# Patient Record
Sex: MALE | Race: WHITE | Employment: FULL TIME | URBAN - METROPOLITAN AREA
[De-identification: names, ages, dates, MRNs, and addresses within clinical notes are randomized per-mention and may not be internally consistent; named-entity substitution may affect disease eponyms.]

---

## 2020-07-20 ENCOUNTER — APPOINTMENT (OUTPATIENT)
Dept: RADIOLOGY | Facility: CLINIC | Age: 30
End: 2020-07-20
Payer: OTHER MISCELLANEOUS

## 2020-07-20 ENCOUNTER — OCCMED (OUTPATIENT)
Dept: URGENT CARE | Facility: CLINIC | Age: 30
End: 2020-07-20
Payer: OTHER MISCELLANEOUS

## 2020-07-20 DIAGNOSIS — S39.92XA BACK INJURY, INITIAL ENCOUNTER: ICD-10-CM

## 2020-07-20 DIAGNOSIS — S39.92XA BACK INJURY, INITIAL ENCOUNTER: Primary | ICD-10-CM

## 2020-07-20 PROCEDURE — 99283 EMERGENCY DEPT VISIT LOW MDM: CPT | Performed by: PHYSICIAN ASSISTANT

## 2020-07-20 PROCEDURE — 72072 X-RAY EXAM THORAC SPINE 3VWS: CPT

## 2020-07-20 PROCEDURE — G0382 LEV 3 HOSP TYPE B ED VISIT: HCPCS | Performed by: PHYSICIAN ASSISTANT

## 2020-07-23 ENCOUNTER — APPOINTMENT (OUTPATIENT)
Dept: URGENT CARE | Facility: CLINIC | Age: 30
End: 2020-07-23
Payer: OTHER MISCELLANEOUS

## 2020-07-23 PROCEDURE — 99213 OFFICE O/P EST LOW 20 MIN: CPT

## 2023-09-22 ENCOUNTER — OFFICE VISIT (OUTPATIENT)
Dept: URGENT CARE | Facility: CLINIC | Age: 33
End: 2023-09-22
Payer: COMMERCIAL

## 2023-09-22 VITALS
BODY MASS INDEX: 30.81 KG/M2 | DIASTOLIC BLOOD PRESSURE: 69 MMHG | OXYGEN SATURATION: 99 % | HEART RATE: 102 BPM | SYSTOLIC BLOOD PRESSURE: 152 MMHG | WEIGHT: 208 LBS | HEIGHT: 69 IN | TEMPERATURE: 102.8 F | RESPIRATION RATE: 20 BRPM

## 2023-09-22 DIAGNOSIS — J02.9 SORE THROAT: ICD-10-CM

## 2023-09-22 DIAGNOSIS — B34.9 VIRAL INFECTION: Primary | ICD-10-CM

## 2023-09-22 LAB — S PYO AG THROAT QL: NEGATIVE

## 2023-09-22 PROCEDURE — 87070 CULTURE OTHR SPECIMN AEROBIC: CPT | Performed by: FAMILY MEDICINE

## 2023-09-22 PROCEDURE — 87636 SARSCOV2 & INF A&B AMP PRB: CPT | Performed by: FAMILY MEDICINE

## 2023-09-22 PROCEDURE — 87880 STREP A ASSAY W/OPTIC: CPT | Performed by: FAMILY MEDICINE

## 2023-09-22 RX ORDER — IBUPROFEN 600 MG/1
600 TABLET ORAL EVERY 6 HOURS PRN
Qty: 60 TABLET | Refills: 0 | Status: SHIPPED | OUTPATIENT
Start: 2023-09-22

## 2023-09-22 NOTE — PROGRESS NOTES
[facility]        NAME: Margarita Garcia is a 29 y.o. male  : 1990    MRN: [id number]  DATE: 2023  TIME: 7:37 PM    Assessment and Plan   Viral infection [B34.9]  1. Viral infection  Covid/Flu-Office Collect    Throat culture    ibuprofen (MOTRIN) 600 mg tablet      2. Sore throat  POCT rapid strepA            Patient Instructions       Follow up with PCP in 3-5 days. Proceed to  ER if symptoms worsen. Chief Complaint     Chief Complaint   Patient presents with   • Fever     Patient presents with fever/chills, body aches, fatigue and sore throat that began yesterday. Denies cough. History of Present Illness       28-year-old male with 2-day history of sore throat fevers and chills. He also reports experiencing a mild headache today intermittently throughout the day in conjunction with back pain which he states he has been having for the past week after injuring his back. Denies any coughs or congestions. Denies any nausea or vomiting. Review of Systems   Review of Systems   Constitutional: Positive for fever. HENT: Negative. Eyes: Negative. Respiratory: Negative. Cardiovascular: Negative. Gastrointestinal: Negative. Genitourinary: Negative. Musculoskeletal: Positive for arthralgias and myalgias. Skin: Negative. Allergic/Immunologic: Negative. Neurological: Positive for headaches. Hematological: Negative. Psychiatric/Behavioral: Negative.           Current Medications       Current Outpatient Medications:   •  ibuprofen (MOTRIN) 600 mg tablet, Take 1 tablet (600 mg total) by mouth every 6 (six) hours as needed for mild pain, fever or headaches, Disp: 60 tablet, Rfl: 0    Current Allergies     Allergies as of 2023   • (No Known Allergies)            The following portions of the patient's history were reviewed and updated as appropriate: allergies, current medications, past family history, past medical history, past social history, past surgical history and problem list.     History reviewed. No pertinent past medical history. Past Surgical History:   Procedure Laterality Date   • HERNIA REPAIR         History reviewed. No pertinent family history. Medications have been verified. Objective   /69   Pulse 102   Temp (!) 102.8 °F (39.3 °C) (Temporal)   Resp 20   Ht 5' 9" (1.753 m)   Wt 94.3 kg (208 lb)   SpO2 99%   BMI 30.72 kg/m²   No LMP for male patient. Physical Exam     Physical Exam  Constitutional:       Appearance: He is well-developed. HENT:      Head: Normocephalic. Mouth/Throat:      Pharynx: No oropharyngeal exudate or posterior oropharyngeal erythema. Eyes:      Pupils: Pupils are equal, round, and reactive to light. Cardiovascular:      Rate and Rhythm: Regular rhythm. Tachycardia present. Pulmonary:      Effort: Pulmonary effort is normal.   Abdominal:      General: Abdomen is flat. Musculoskeletal:         General: Normal range of motion. Cervical back: Normal range of motion. Skin:     General: Skin is warm. Neurological:      Mental Status: He is alert and oriented to person, place, and time.

## 2023-09-23 LAB
FLUAV RNA RESP QL NAA+PROBE: NEGATIVE
FLUBV RNA RESP QL NAA+PROBE: NEGATIVE
SARS-COV-2 RNA RESP QL NAA+PROBE: NEGATIVE

## 2023-09-24 LAB — BACTERIA THROAT CULT: NORMAL

## 2023-09-25 LAB — BACTERIA THROAT CULT: NORMAL

## 2024-03-07 ENCOUNTER — APPOINTMENT (OUTPATIENT)
Dept: RADIOLOGY | Facility: CLINIC | Age: 34
End: 2024-03-07
Payer: OTHER MISCELLANEOUS

## 2024-03-07 ENCOUNTER — OCCMED (OUTPATIENT)
Dept: URGENT CARE | Facility: CLINIC | Age: 34
End: 2024-03-07
Payer: OTHER MISCELLANEOUS

## 2024-03-07 DIAGNOSIS — M25.512 ACUTE PAIN OF LEFT SHOULDER: ICD-10-CM

## 2024-03-07 DIAGNOSIS — M25.512 ACUTE PAIN OF LEFT SHOULDER: Primary | ICD-10-CM

## 2024-03-07 DIAGNOSIS — Z02.1 NEED FOR HISTORY AND PHYSICAL EXAMINATION FOR EMPLOYMENT: ICD-10-CM

## 2024-03-07 PROCEDURE — G0382 LEV 3 HOSP TYPE B ED VISIT: HCPCS

## 2024-03-07 PROCEDURE — 73030 X-RAY EXAM OF SHOULDER: CPT

## 2024-03-07 PROCEDURE — 99283 EMERGENCY DEPT VISIT LOW MDM: CPT

## 2024-03-11 ENCOUNTER — APPOINTMENT (OUTPATIENT)
Dept: URGENT CARE | Facility: CLINIC | Age: 34
End: 2024-03-11
Payer: OTHER MISCELLANEOUS

## 2024-03-11 PROCEDURE — 99213 OFFICE O/P EST LOW 20 MIN: CPT | Performed by: PHYSICIAN ASSISTANT

## 2024-07-18 ENCOUNTER — OCCMED (OUTPATIENT)
Dept: URGENT CARE | Facility: CLINIC | Age: 34
End: 2024-07-18
Payer: OTHER MISCELLANEOUS

## 2024-07-18 DIAGNOSIS — Z23 ENCOUNTER FOR IMMUNIZATION: Primary | ICD-10-CM

## 2024-07-18 PROCEDURE — 99283 EMERGENCY DEPT VISIT LOW MDM: CPT

## 2024-07-18 PROCEDURE — G0382 LEV 3 HOSP TYPE B ED VISIT: HCPCS

## 2024-07-22 ENCOUNTER — APPOINTMENT (OUTPATIENT)
Dept: URGENT CARE | Facility: CLINIC | Age: 34
End: 2024-07-22
Payer: OTHER MISCELLANEOUS

## 2024-07-22 PROCEDURE — 99214 OFFICE O/P EST MOD 30 MIN: CPT | Performed by: PHYSICIAN ASSISTANT

## 2025-03-13 ENCOUNTER — OFFICE VISIT (OUTPATIENT)
Dept: FAMILY MEDICINE CLINIC | Facility: HOSPITAL | Age: 35
End: 2025-03-13

## 2025-03-13 ENCOUNTER — TELEPHONE (OUTPATIENT)
Age: 35
End: 2025-03-13

## 2025-03-13 ENCOUNTER — APPOINTMENT (OUTPATIENT)
Dept: RADIOLOGY | Facility: CLINIC | Age: 35
End: 2025-03-13
Payer: COMMERCIAL

## 2025-03-13 VITALS
BODY MASS INDEX: 31.78 KG/M2 | TEMPERATURE: 97.3 F | OXYGEN SATURATION: 96 % | SYSTOLIC BLOOD PRESSURE: 116 MMHG | HEIGHT: 70 IN | HEART RATE: 69 BPM | WEIGHT: 222 LBS | DIASTOLIC BLOOD PRESSURE: 78 MMHG

## 2025-03-13 VITALS — BODY MASS INDEX: 32.88 KG/M2 | HEIGHT: 69 IN | WEIGHT: 222 LBS

## 2025-03-13 DIAGNOSIS — M25.512 ACUTE PAIN OF LEFT SHOULDER: ICD-10-CM

## 2025-03-13 DIAGNOSIS — Z13.29 SCREENING FOR THYROID DISORDER: ICD-10-CM

## 2025-03-13 DIAGNOSIS — L84 FOOT CALLUS: ICD-10-CM

## 2025-03-13 DIAGNOSIS — Z13.0 SCREENING, ANEMIA, DEFICIENCY, IRON: ICD-10-CM

## 2025-03-13 DIAGNOSIS — M25.512 ACUTE PAIN OF LEFT SHOULDER: Primary | ICD-10-CM

## 2025-03-13 DIAGNOSIS — E66.9 OBESITY (BMI 30-39.9): ICD-10-CM

## 2025-03-13 DIAGNOSIS — Z72.51 HIGH RISK SEXUAL BEHAVIOR, UNSPECIFIED TYPE: ICD-10-CM

## 2025-03-13 DIAGNOSIS — Z00.00 ANNUAL WELLNESS VISIT: Primary | ICD-10-CM

## 2025-03-13 DIAGNOSIS — Z76.89 ENCOUNTER TO ESTABLISH CARE: ICD-10-CM

## 2025-03-13 DIAGNOSIS — Z13.6 SCREENING FOR CARDIOVASCULAR CONDITION: ICD-10-CM

## 2025-03-13 PROCEDURE — 99203 OFFICE O/P NEW LOW 30 MIN: CPT

## 2025-03-13 PROCEDURE — 99385 PREV VISIT NEW AGE 18-39: CPT

## 2025-03-13 PROCEDURE — 73030 X-RAY EXAM OF SHOULDER: CPT

## 2025-03-13 PROCEDURE — 99204 OFFICE O/P NEW MOD 45 MIN: CPT | Performed by: ORTHOPAEDIC SURGERY

## 2025-03-13 NOTE — ASSESSMENT & PLAN NOTE
Concern for tendinitis vs sprain vs nerve impingement    Plan:  -Establish with ortho  -Will order MRI L shoulder given arm weakness and hand numbness, f/u results    Orders:    Ambulatory Referral to Orthopedic Surgery; Future    MRI shoulder left wo contrast; Future

## 2025-03-13 NOTE — LETTER
March 13, 2025     Patient: Mathew Sahu  YOB: 1990  Date of Visit: 3/13/2025      To Whom it May Concern:    Mathew Sahu is under my professional care. Mathew was seen in my office on 3/13/2025. Mathew has following work restrictions. No repetitive use of left shoulder/arm, no overhead use of left arm. Max lifting, pushing, pulling 25lbs until next office evaluation.     If you have any questions or concerns, please don't hesitate to call.         Sincerely,          Ceci Byrd MD        CC: No Recipients

## 2025-03-13 NOTE — PROGRESS NOTES
Assessment:     1. Acute pain of left shoulder        Plan:     Problem List Items Addressed This Visit          Surgery/Wound/Pain    Acute pain of left shoulder - Primary    Findings consistent with left shoulder acute on chronic pain.  Left shoulder x-ray reviewed with patient. Conservative treatment reviewed with patient. Concern for possible rotator cuff tear or bicep injury. At this time patient will be referred to physical therapy to rehab left shoulder. He will contact his insurance to see if MRI will be approved at [facility]. Instructed to bring copy of MRI if he does obtain. He was given work restrictions and currently not covered by . Max lifting, pushing, pulling 25 lbs, no repetitive use of left arm, no overhead lifting. Nsaids, topical creams as needed for pain. See back in 6 weeks for reevaluation. All patient's questions were answered to his satisfaction.  This note is created using dictation transcription.  It may contain typographical errors, grammatical errors, improperly dictated words, background noise and other errors.         Relevant Orders    XR shoulder 2+ vw left    Ambulatory Referral to Physical Therapy    Ambulatory Referral to Physical Therapy      Subjective:     Patient ID: Mathew Sahu is a 34 y.o. male.  Chief Complaint:  34 yr old male RHD in for evaluation of left shoulder pain.  Patient referred here by his family physician, Dr. Murguia. History of prior left shoulder injury treated by [facility]. No surgery needed. No formal treatment pain improved over some time. He would experience on and off pain in shoulder since injury last year.  New injury in January 2025. He states he was pulling a motor out of a machine at work. He was initially using a ratchet to loosen bolts and felt an acute pain in shoulder but continued to work. He then attached hoist lift for motor and was pulling it out but couldn't due to pain and needed assistance from another co worker. He was  able to range the arm but with pain. He states since onset pain has improved but still notes anterior based pain. Pain worse reaching behind to put coat or shirt on. Grabbing lifting items away from his body is painful. If he keeps shoulder and arm close to body he has no pain.  Pain at night laying flat on his back. No neck pain current numbness or tingling.  He did mention injury to his boss but this is not currently covered by .     Allergy:  No Known Allergies  Medications:  all current active meds have been reviewed  Past Medical History:  History reviewed. No pertinent past medical history.  Past Surgical History:  Past Surgical History:   Procedure Laterality Date    HERNIA REPAIR       Family History:  Family History   Problem Relation Age of Onset    Cancer Paternal Grandmother      Social History:  Social History     Substance and Sexual Activity   Alcohol Use Never     Social History     Substance and Sexual Activity   Drug Use Never     Social History     Tobacco Use   Smoking Status Former    Types: Cigarettes   Smokeless Tobacco Never     Review of Systems   Constitutional:  Negative for chills and fever.   HENT:  Negative for ear pain and sore throat.    Eyes:  Negative for pain and visual disturbance.   Respiratory:  Negative for cough and shortness of breath.    Cardiovascular:  Negative for chest pain and palpitations.   Gastrointestinal:  Negative for abdominal pain and vomiting.   Genitourinary:  Negative for dysuria and hematuria.   Musculoskeletal:  Positive for arthralgias (left shoulder). Negative for back pain and neck pain.   Skin:  Negative for color change and rash.   Neurological:  Negative for seizures, syncope, weakness and numbness.   Psychiatric/Behavioral: Negative.     All other systems reviewed and are negative.        Objective:  BP Readings from Last 1 Encounters:   03/13/25 116/78      Wt Readings from Last 1 Encounters:   03/13/25 101 kg (222 lb)      BMI:   Estimated body  "mass index is 32.78 kg/m² as calculated from the following:    Height as of this encounter: 5' 9\" (1.753 m).    Weight as of this encounter: 101 kg (222 lb).  BSA:   Estimated body surface area is 2.16 meters squared as calculated from the following:    Height as of this encounter: 5' 9\" (1.753 m).    Weight as of this encounter: 101 kg (222 lb).   Physical Exam  Vitals and nursing note reviewed.   Constitutional:       Appearance: Normal appearance. He is well-developed.   HENT:      Head: Normocephalic and atraumatic.      Right Ear: External ear normal.      Left Ear: External ear normal.   Eyes:      Extraocular Movements: Extraocular movements intact.      Conjunctiva/sclera: Conjunctivae normal.   Pulmonary:      Effort: Pulmonary effort is normal.   Musculoskeletal:         General: Tenderness (left shoulder arthralgia) present.      Cervical back: Neck supple.   Skin:     General: Skin is warm and dry.   Neurological:      Mental Status: He is alert and oriented to person, place, and time.      Deep Tendon Reflexes: Reflexes are normal and symmetric.   Psychiatric:         Mood and Affect: Mood normal.         Behavior: Behavior normal.       Left Shoulder Exam     Tenderness   The patient is experiencing tenderness in the biceps tendon.    Range of Motion   Active abduction:  normal   Passive abduction:  normal   Forward flexion:  normal   Internal rotation 0 degrees:  Lumbar     Muscle Strength   Abduction: 4/5   Internal rotation: 5/5   External rotation: 5/5   Biceps: 5/5     Tests   Cross arm: negative  Impingement: negative  Drop arm: negative    Other   Erythema: absent  Scars: absent  Sensation: normal  Pulse: present     Comments:  Pain against resisted ABD  Positive O'danielle's   Positive Speed  Positive empty can            I have personally reviewed pertinent films in PACS and my interpretation is x-rays left shoulder AC joint slight widen unchanged from previous imaging, well maintained joint " spaces, no calcifications.    Scribe Attestation      I,:  Deonte Hernandez am acting as a scribe while in the presence of the attending physician.:       I,:  Ceci Byrd MD personally performed the services described in this documentation    as scribed in my presence.:

## 2025-03-13 NOTE — ASSESSMENT & PLAN NOTE
Findings consistent with left shoulder acute on chronic pain.  Left shoulder x-ray reviewed with patient. Conservative treatment reviewed with patient. Concern for possible rotator cuff tear or bicep injury. At this time patient will be referred to physical therapy to rehab left shoulder. He will contact his insurance to see if MRI will be approved at [facility]. Instructed to bring copy of MRI if he does obtain. He was given work restrictions and currently not covered by . Max lifting, pushing, pulling 25 lbs, no repetitive use of left arm, no overhead lifting. Nsaids, topical creams as needed for pain. See back in 6 weeks for reevaluation. All patient's questions were answered to his satisfaction.  This note is created using dictation transcription.  It may contain typographical errors, grammatical errors, improperly dictated words, background noise and other errors.

## 2025-03-13 NOTE — PATIENT INSTRUCTIONS
"Patient Education     Routine physical for adults   The Basics   Written by the doctors and editors at [facility]   What is a physical? -- A physical is a routine visit, or \"check-up,\" with your doctor. You might also hear it called a \"wellness visit\" or \"preventive visit.\"  During each visit, the doctor will:   Ask about your physical and mental health   Ask about your habits, behaviors, and lifestyle   Do an exam   Give you vaccines if needed   Talk to you about any medicines you take   Give advice about your health   Answer your questions  Getting regular check-ups is an important part of taking care of your health. It can help your doctor find and treat any problems you have. But it's also important for preventing health problems.  A routine physical is different from a \"sick visit.\" A sick visit is when you see a doctor because of a health concern or problem. Since physicals are scheduled ahead of time, you can think about what you want to ask the doctor.  How often should I get a physical? -- It depends on your age and health. In general, for people age 21 years and older:   If you are younger than 50 years, you might be able to get a physical every 3 years.   If you are 50 years or older, your doctor might recommend a physical every year.  If you have an ongoing health condition, like diabetes or high blood pressure, your doctor will probably want to see you more often.  What happens during a physical? -- In general, each visit will include:   Physical exam - The doctor or nurse will check your height, weight, heart rate, and blood pressure. They will also look at your eyes and ears. They will ask about how you are feeling and whether you have any symptoms that bother you.   Medicines - It's a good idea to bring a list of all the medicines you take to each doctor visit. Your doctor will talk to you about your medicines and answer any questions. Tell them if you are having any side effects that bother you. You " "should also tell them if you are having trouble paying for any of your medicines.   Habits and behaviors - This includes:   Your diet   Your exercise habits   Whether you smoke, drink alcohol, or use drugs   Whether you are sexually active   Whether you feel safe at home  Your doctor will talk to you about things you can do to improve your health and lower your risk of health problems. They will also offer help and support. For example, if you want to quit smoking, they can give you advice and might prescribe medicines. If you want to improve your diet or get more physical activity, they can help you with this, too.   Lab tests, if needed - The tests you get will depend on your age and situation. For example, your doctor might want to check your:   Cholesterol   Blood sugar   Iron level   Vaccines - The recommended vaccines will depend on your age, health, and what vaccines you already had. Vaccines are very important because they can prevent certain serious or deadly infections.   Discussion of screening - \"Screening\" means checking for diseases or other health problems before they cause symptoms. Your doctor can recommend screening based on your age, risk, and preferences. This might include tests to check for:   Cancer, such as breast, prostate, cervical, ovarian, colorectal, prostate, lung, or skin cancer   Sexually transmitted infections, such as chlamydia and gonorrhea   Mental health conditions like depression and anxiety  Your doctor will talk to you about the different types of screening tests. They can help you decide which screenings to have. They can also explain what the results might mean.   Answering questions - The physical is a good time to ask the doctor or nurse questions about your health. If needed, they can refer you to other doctors or specialists, too.  Adults older than 65 years often need other care, too. As you get older, your doctor will talk to you about:   How to prevent falling at " home   Hearing or vision tests   Memory testing   How to take your medicines safely   Making sure that you have the help and support you need at home  All topics are updated as new evidence becomes available and our peer review process is complete.  This topic retrieved from [employer] on: May 02, 2024.  Topic 395027 Version 1.0  Release: 32.4.3 - C32.122  © 2024 UpToDate, Inc. and/or its affiliates. All rights reserved.  Consumer Information Use and Disclaimer   Disclaimer: This generalized information is a limited summary of diagnosis, treatment, and/or medication information. It is not meant to be comprehensive and should be used as a tool to help the user understand and/or assess potential diagnostic and treatment options. It does NOT include all information about conditions, treatments, medications, side effects, or risks that may apply to a specific patient. It is not intended to be medical advice or a substitute for the medical advice, diagnosis, or treatment of a health care provider based on the health care provider's examination and assessment of a patient's specific and unique circumstances. Patients must speak with a health care provider for complete information about their health, medical questions, and treatment options, including any risks or benefits regarding use of medications. This information does not endorse any treatments or medications as safe, effective, or approved for treating a specific patient. UpToDate, Inc. and its affiliates disclaim any warranty or liability relating to this information or the use thereof.The use of this information is governed by the Terms of Use, available at https://www.wolterscoUrbanizeuwer.com/en/know/clinical-effectiveness-terms. 2024© UpToDate, Inc. and its affiliates and/or licensors. All rights reserved.  Copyright   © 2024 UpToDate, Inc. and/or its affiliates. All rights reserved.

## 2025-03-13 NOTE — PROGRESS NOTES
Adult Annual Physical  Name: Mathew Sahu      : 1990      MRN: [id number]  Encounter Provider: Mitzi Murguia DO  Encounter Date: 3/13/2025   Encounter department: [facility] [address]    Assessment & Plan  Annual wellness visit         Encounter to establish care         Acute pain of left shoulder  Concern for tendinitis vs sprain vs nerve impingement    Plan:  -Establish with ortho  -Will order MRI L shoulder given arm weakness and hand numbness, f/u results    Orders:    Ambulatory Referral to Orthopedic Surgery; Future    MRI shoulder left wo contrast; Future    Foot callus    Orders:    Ambulatory Referral to Podiatry; Future    Screening for cardiovascular condition    Orders:    Hemoglobin A1C; Future    Lipid panel; Future    Obesity (BMI 30-39.9)      Orders:    Hemoglobin A1C; Future    Lipid panel; Future    Screening for thyroid disorder    Orders:    TSH, 3rd generation with Free T4 reflex; Future    Screening, anemia, deficiency, iron    Orders:    CBC and differential; Future    High risk sexual behavior, unspecified type    Orders:    HIV 1/2 AG/AB w Reflex [facility] for 2 yr old and above; Future    RPR-Syphilis Screening (Total Syphilis IGG/IGM); Future    Chlamydia/GC amplified DNA by PCR; Future    Hepatitis B surface antigen; Future    Preventive Screenings:    - Prostate cancer screening: screening not indicated     Immunizations:  - Immunizations due: Influenza and Prevnar 20    BMI Counseling: Body mass index is 31.85 kg/m². The BMI is above normal. Exercise recommendations include exercising 3-5 times per week.         History of Present Illness     Adult Annual Physical:  Patient presents for annual physical. Patient is agreeable to further discussion outside topics of annual physical.    L arm pain x2-3 months  - Repetitive movements aggravated arm  -Removing a motor at work at the time  -No injury in the past to that shoulder  In middle of night,  tingling in entire hand  Weakness with flexion and external rotation of L arm  Will be seeing ortho later today, requests further imaging    Requests referral to podiatry d/t callus of L foot, now causing pain.     Diet and Physical Activity:  - Diet/Nutrition: well balanced diet.  - Exercise: walking.    Depression Screening:  - PHQ-2 Score: 0    General Health:  - Sleep: 4-6 hours of sleep on average and 7-8 hours of sleep on average.  - Hearing: normal hearing bilateral ears. clogging when sick  - Vision: no vision problems.  - Dental: no dental visits for > 1 year.     Health:  - History of STDs: no.   - Urinary symptoms: none.       Preventative Screening  STD screening agreeable  Denies Family history of breast cancer, colon cancer, prostate cancer  Labs: Hgb [id number], FLP ordered    Immunizations  Last TDAP 2024  Last flu declines    BMI Counseling: Body mass index is 31.85 kg/m². The BMI is above normal. Exercise recommendations include moderate aerobic physical activity for 150 minutes/week.    Wt Readings from Last 3 Encounters:   03/13/25 101 kg (222 lb)   09/22/23 94.3 kg (208 lb)       PHQ-2/9 Depression Screening    Little interest or pleasure in doing things: 0 - not at all  Feeling down, depressed, or hopeless: 0 - not at all  PHQ-2 Score: 0  PHQ-2 Interpretation: Negative depression screen             Family History  reviewed    Allergies  denies    Social History  Tobacco - quit few years ago, started age 17, smoking 1/2PPD off & on  Alcohol - denies  Illicit Drugs - denies      Review of Systems   Respiratory:  Negative for shortness of breath.    Cardiovascular:  Negative for chest pain.   Gastrointestinal:  Negative for abdominal pain, nausea and vomiting.   Musculoskeletal:  Negative for back pain and neck pain.   Skin:  Negative for rash.   Allergic/Immunologic: Negative for environmental allergies and food allergies.   Psychiatric/Behavioral:  Negative for sleep disturbance.      Current  "Outpatient Medications on File Prior to Visit   Medication Sig Dispense Refill    ibuprofen (MOTRIN) 600 mg tablet Take 1 tablet (600 mg total) by mouth every 6 (six) hours as needed for mild pain, fever or headaches 60 tablet 0     No current facility-administered medications on file prior to visit.      Social History     Tobacco Use    Smoking status: Former     Types: Cigarettes    Smokeless tobacco: Never   Vaping Use    Vaping status: Former   Substance and Sexual Activity    Alcohol use: Never    Drug use: Never    Sexual activity: Not on file       Objective   /78   Pulse 69   Temp (!) 97.3 °F (36.3 °C) (Tympanic)   Ht 5' 10\" (1.778 m)   Wt 101 kg (222 lb)   SpO2 96%   BMI 31.85 kg/m²     Physical Exam  Vitals reviewed.   Constitutional:       General: He is not in acute distress.     Appearance: Normal appearance. He is obese. He is not ill-appearing.   HENT:      Head: Normocephalic and atraumatic.      Right Ear: Tympanic membrane, ear canal and external ear normal. There is no impacted cerumen.      Left Ear: Tympanic membrane, ear canal and external ear normal. There is no impacted cerumen.      Nose: Nose normal.      Mouth/Throat:      Mouth: Mucous membranes are moist.      Pharynx: No posterior oropharyngeal erythema.   Cardiovascular:      Rate and Rhythm: Normal rate and regular rhythm.      Heart sounds: Normal heart sounds.   Pulmonary:      Effort: Pulmonary effort is normal.      Breath sounds: Normal breath sounds.   Abdominal:      General: Bowel sounds are normal.      Palpations: Abdomen is soft.      Tenderness: There is no abdominal tenderness. There is no guarding or rebound.   Musculoskeletal:         General: Tenderness (lateral and posterior aspect of humeral head) present. No swelling, deformity or signs of injury.   Neurological:      Mental Status: He is alert.   Psychiatric:         Mood and Affect: Mood normal.         Behavior: Behavior normal.           Mitzi " Blade, DO  Family Medicine

## 2025-03-14 ENCOUNTER — TELEPHONE (OUTPATIENT)
Dept: OBGYN CLINIC | Facility: CLINIC | Age: 35
End: 2025-03-14

## 2025-03-14 NOTE — TELEPHONE ENCOUNTER
Received incoming fax from [facility] requesting Medical Report Form to be completed and faxed back to them at [phone]. Faxed information received confirmation. (See Attached)

## 2025-04-02 ENCOUNTER — RESULTS FOLLOW-UP (OUTPATIENT)
Dept: FAMILY MEDICINE CLINIC | Facility: HOSPITAL | Age: 35
End: 2025-04-02

## 2025-04-08 ENCOUNTER — OFFICE VISIT (OUTPATIENT)
Dept: OBGYN CLINIC | Facility: CLINIC | Age: 35
End: 2025-04-08

## 2025-04-08 VITALS — HEIGHT: 70 IN | BODY MASS INDEX: 33.79 KG/M2 | WEIGHT: 236 LBS

## 2025-04-08 DIAGNOSIS — M25.512 ACUTE PAIN OF LEFT SHOULDER: Primary | ICD-10-CM

## 2025-04-08 PROCEDURE — 99213 OFFICE O/P EST LOW 20 MIN: CPT | Performed by: ORTHOPAEDIC SURGERY

## 2025-04-08 NOTE — ASSESSMENT & PLAN NOTE
Findings are consistent with left shoulder rotator cuff tendinitis with possible rotator cuff tear and mild glenohumeral arthritis. Findings and treatment options discussed with patient. MRI was reviewed and radiologist report discussed with patient. Discussed with patient to start physical therapy. Referral to  physical therapy given at previous office visit. Continue with work restrictions given at previous office visit. Discussed and demonstrated modified lifting techniques to limit stress on the shoulder. Patient can continue NSAIDs and topical Voltaren gel or Aspercreme as needed for pain. Follow up in 6 weeks. All patient's questions were answered to his satisfaction.  This note is created using dictation transcription.  It may contain typographical errors, grammatical errors, improperly dictated words, background noise and other errors.

## 2025-04-08 NOTE — PROGRESS NOTES
Assessment:     1. Acute pain of left shoulder        Plan:     Problem List Items Addressed This Visit          Surgery/Wound/Pain    Acute pain of left shoulder - Primary    Findings are consistent with left shoulder rotator cuff tendinitis with possible rotator cuff tear and mild glenohumeral arthritis. Findings and treatment options discussed with patient. MRI was reviewed and radiologist report discussed with patient. Discussed with patient to start physical therapy. Referral to  physical therapy given at previous office visit. Continue with work restrictions given at previous office visit. Discussed and demonstrated modified lifting techniques to limit stress on the shoulder. Patient can continue NSAIDs and topical Voltaren gel or Aspercreme as needed for pain. Follow up in 6 weeks. All patient's questions were answered to his satisfaction.  This note is created using dictation transcription.  It may contain typographical errors, grammatical errors, improperly dictated words, background noise and other errors.           Subjective:     Patient ID: Mathew Sahu is a 35 y.o. male.  Chief Complaint:  35 y.o. male presents today for follow up for left shoulder pain. He reports having no improvement in his shoulder pain. He notes he has continued to work, modifying activity with his left shoulder as needed, but notes continued pain. He states having increased pain in the morning, but notes it does get a little better as the day goes on. He notes he has not started physical therapy at this time due to wanting to wait for the discussion of his MRI. He denies any new trauma or injury to the knee.     Allergy:  No Known Allergies  Medications:  all current active meds have been reviewed  Past Medical History:  History reviewed. No pertinent past medical history.  Past Surgical History:  Past Surgical History:   Procedure Laterality Date    HERNIA REPAIR       Family History:  Family History   Problem Relation Age of  "Onset    Cancer Paternal Grandmother      Social History:  Social History     Substance and Sexual Activity   Alcohol Use Never     Social History     Substance and Sexual Activity   Drug Use Never     Social History     Tobacco Use   Smoking Status Former    Types: Cigarettes   Smokeless Tobacco Never     Review of Systems   Constitutional:  Negative for chills and fever.   HENT:  Negative for ear pain and sore throat.    Eyes:  Negative for pain and visual disturbance.   Respiratory:  Negative for cough and shortness of breath.    Cardiovascular:  Negative for chest pain and palpitations.   Gastrointestinal:  Negative for abdominal pain and vomiting.   Genitourinary:  Negative for dysuria and hematuria.   Musculoskeletal:  Negative for arthralgias and back pain.   Skin:  Negative for color change and rash.   Neurological:  Negative for seizures and syncope.   All other systems reviewed and are negative.        Objective:  BP Readings from Last 1 Encounters:   03/13/25 116/78      Wt Readings from Last 1 Encounters:   04/08/25 107 kg (236 lb)      BMI:   Estimated body mass index is 33.86 kg/m² as calculated from the following:    Height as of this encounter: 5' 10\" (1.778 m).    Weight as of this encounter: 107 kg (236 lb).  BSA:   Estimated body surface area is 2.24 meters squared as calculated from the following:    Height as of this encounter: 5' 10\" (1.778 m).    Weight as of this encounter: 107 kg (236 lb).   Physical Exam  Vitals and nursing note reviewed.   Constitutional:       Appearance: Normal appearance. He is well-developed.   HENT:      Head: Normocephalic and atraumatic.      Right Ear: External ear normal.      Left Ear: External ear normal.      Nose: Nose normal.   Eyes:      General: No scleral icterus.     Extraocular Movements: Extraocular movements intact.      Conjunctiva/sclera: Conjunctivae normal.   Cardiovascular:      Rate and Rhythm: Normal rate.   Pulmonary:      Effort: Pulmonary " effort is normal. No respiratory distress.   Musculoskeletal:      Cervical back: Normal range of motion and neck supple.      Comments: See Ortho exam   Skin:     General: Skin is warm and dry.   Neurological:      General: No focal deficit present.      Mental Status: He is alert and oriented to person, place, and time.   Psychiatric:         Behavior: Behavior normal.       Left Shoulder Exam     Tenderness   The patient is experiencing tenderness in the biceps tendon.    Range of Motion   The patient has normal left shoulder ROM.    Muscle Strength   Abduction: 4/5   Internal rotation: 5/5   External rotation: 5/5     Tests   Cross arm: negative  Impingement: negative  Drop arm: negative    Other   Erythema: absent  Scars: absent  Sensation: normal  Pulse: present     Comments:  Pain against resisted ABD  Positive O'danielle's   Positive Speed  Positive empty can              I have personally reviewed pertinent films in PACS and my interpretation is MRI of left shoulder demonstrates suspicious for small bursal sided insertional tear of the supraspinatus. Mild glenohumeral degenerative changes.    Scribe Attestation      I,:  Kael Don am acting as a scribe while in the presence of the attending physician.:       I,:  Ceci Byrd MD personally performed the services described in this documentation    as scribed in my presence.:

## 2025-04-29 ENCOUNTER — OFFICE VISIT (OUTPATIENT)
Dept: PODIATRY | Facility: CLINIC | Age: 35
End: 2025-04-29
Payer: COMMERCIAL

## 2025-04-29 VITALS — WEIGHT: 230 LBS | BODY MASS INDEX: 32.93 KG/M2 | HEIGHT: 70 IN

## 2025-04-29 DIAGNOSIS — Q66.71 PES CAVUS OF BOTH FEET: ICD-10-CM

## 2025-04-29 DIAGNOSIS — M79.672 PAIN IN LEFT FOOT: Primary | ICD-10-CM

## 2025-04-29 DIAGNOSIS — L85.1 PLANTAR POROKERATOSIS, ACQUIRED: ICD-10-CM

## 2025-04-29 DIAGNOSIS — Q66.72 PES CAVUS OF BOTH FEET: ICD-10-CM

## 2025-04-29 PROCEDURE — 99202 OFFICE O/P NEW SF 15 MIN: CPT | Performed by: PODIATRIST

## 2025-04-30 NOTE — PROGRESS NOTES
Name: Mathew Sahu      : 1990      MRN: [id number]  Encounter Provider: Omar Graves DPM  Encounter Date: 2025   Encounter department: [facility]  :  Assessment & Plan  Plantar porokeratosis, acquired  Treatment options for painful porokeratotic lesions discussed with patient.  Recommend palliative care on an as-needed basis in conjunction with good athletic running shoes with a robust forefoot padding.  May benefit from a custom molded orthotic with forefoot accommodations permanently integrated into the forefoot extension.  Paring benign hyperkeratotic lesions x 4  Orders:  •  Ambulatory Referral to Podiatry    Pain in left foot  As noted above       Pes cavus of both feet  Contributes to lesion formation due to plantarflexed metatarsals 1 and 5 bilateral.           History of Present Illness   HPI  Mathew Sahu is a 35 y.o. male who presents painful left foot lesion.  Reports this lesion has been present for a couple years but has recently become more tender and symptomatic.  Has similar lesion on the other foot but not as bad.  History obtained from: patient    Review of Systems   Constitutional: Negative.    HENT: Negative.     Eyes: Negative.    Respiratory: Negative.     Cardiovascular: Negative.    Gastrointestinal: Negative.    Endocrine: Negative.    Genitourinary: Negative.    Musculoskeletal:         High arch foot bilateral   Skin:         Painful hyperkeratotic lesions bilateral left greater than right.   Allergic/Immunologic: Negative.    Neurological: Negative.    Hematological: Negative.    Psychiatric/Behavioral: Negative.       Past Medical History   History reviewed. No pertinent past medical history.  Past Surgical History:   Procedure Laterality Date   • HERNIA REPAIR       Family History   Problem Relation Age of Onset   • Cancer Paternal Grandmother       reports that he has quit smoking. His smoking use included cigarettes. He has never used  "smokeless tobacco. He reports that he does not drink alcohol and does not use drugs.  No current outpatient medicationsNo Known Allergies      Objective   Ht 5' 10\" (1.778 m)   Wt 104 kg (230 lb)   BMI 33.00 kg/m²      Physical Exam  Vitals reviewed.   Constitutional:       General: He is not in acute distress.     Appearance: He is well-developed.   HENT:      Head: Normocephalic and atraumatic.      Nose: Nose normal.   Eyes:      Conjunctiva/sclera: Conjunctivae normal.   Cardiovascular:      Rate and Rhythm: Normal rate and regular rhythm.      Pulses: Normal pulses.   Pulmonary:      Effort: Pulmonary effort is normal.   Musculoskeletal:         General: Tenderness and deformity present.      Cervical back: Neck supple.      Comments:   Bilateral pes cavus deformity with plantarflexed 1st and 5th metatarsals.     Feet:      Right foot:      Protective Sensation: 10 sites tested.  10 sites sensed.      Left foot:      Protective Sensation: 10 sites tested.  10 sites sensed.   Skin:     General: Skin is warm and dry.      Capillary Refill: Capillary refill takes 2 to 3 seconds.      Comments:   Left foot subfifth MPJ 0.6 cm² painful porokeratotic lesion.  This lesion is the worst out of all 4.  Lesions are present bilateral subfirst and subfifth MPJs.  Pain limits walking and altered gait.   Neurological:      General: No focal deficit present.      Mental Status: He is alert and oriented to person, place, and time.   Psychiatric:         Mood and Affect: Mood normal.           "

## 2025-04-30 NOTE — ASSESSMENT & PLAN NOTE
Treatment options for painful porokeratotic lesions discussed with patient.  Recommend palliative care on an as-needed basis in conjunction with good athletic running shoes with a robust forefoot padding.  May benefit from a custom molded orthotic with forefoot accommodations permanently integrated into the forefoot extension.  Paring benign hyperkeratotic lesions x 4  Orders:  •  Ambulatory Referral to Podiatry